# Patient Record
Sex: FEMALE | Race: AMERICAN INDIAN OR ALASKA NATIVE | NOT HISPANIC OR LATINO | URBAN - METROPOLITAN AREA
[De-identification: names, ages, dates, MRNs, and addresses within clinical notes are randomized per-mention and may not be internally consistent; named-entity substitution may affect disease eponyms.]

---

## 2020-02-29 ENCOUNTER — OUTPATIENT (OUTPATIENT)
Dept: OUTPATIENT SERVICES | Facility: HOSPITAL | Age: 60
LOS: 1 days | Discharge: HOME | End: 2020-02-29
Payer: COMMERCIAL

## 2020-02-29 DIAGNOSIS — Z12.31 ENCOUNTER FOR SCREENING MAMMOGRAM FOR MALIGNANT NEOPLASM OF BREAST: ICD-10-CM

## 2020-02-29 PROCEDURE — 77063 BREAST TOMOSYNTHESIS BI: CPT | Mod: 26

## 2020-02-29 PROCEDURE — 77067 SCR MAMMO BI INCL CAD: CPT | Mod: 26

## 2020-04-01 ENCOUNTER — OUTPATIENT (OUTPATIENT)
Dept: OUTPATIENT SERVICES | Facility: HOSPITAL | Age: 60
LOS: 1 days | Discharge: HOME | End: 2020-04-01
Payer: COMMERCIAL

## 2020-04-01 DIAGNOSIS — R92.8 OTHER ABNORMAL AND INCONCLUSIVE FINDINGS ON DIAGNOSTIC IMAGING OF BREAST: ICD-10-CM

## 2020-04-01 PROCEDURE — G0279: CPT | Mod: 26

## 2020-04-01 PROCEDURE — 77065 DX MAMMO INCL CAD UNI: CPT | Mod: 26,LT

## 2020-12-22 ENCOUNTER — APPOINTMENT (OUTPATIENT)
Dept: OPHTHALMOLOGY | Facility: CLINIC | Age: 60
End: 2020-12-22

## 2021-10-21 PROBLEM — Z00.00 ENCOUNTER FOR PREVENTIVE HEALTH EXAMINATION: Status: ACTIVE | Noted: 2021-10-21

## 2021-11-01 ENCOUNTER — OUTPATIENT (OUTPATIENT)
Dept: OUTPATIENT SERVICES | Facility: HOSPITAL | Age: 61
LOS: 1 days | Discharge: HOME | End: 2021-11-01
Payer: COMMERCIAL

## 2021-11-01 ENCOUNTER — RESULT REVIEW (OUTPATIENT)
Age: 61
End: 2021-11-01

## 2021-11-01 DIAGNOSIS — M25.512 PAIN IN LEFT SHOULDER: ICD-10-CM

## 2021-11-01 PROCEDURE — 73221 MRI JOINT UPR EXTREM W/O DYE: CPT | Mod: 26,LT

## 2022-08-26 ENCOUNTER — APPOINTMENT (OUTPATIENT)
Dept: ORTHOPEDIC SURGERY | Facility: CLINIC | Age: 62
End: 2022-08-26

## 2022-08-26 VITALS — HEIGHT: 64 IN | WEIGHT: 130 LBS | BODY MASS INDEX: 22.2 KG/M2

## 2022-08-26 DIAGNOSIS — E11.9 TYPE 2 DIABETES MELLITUS W/OUT COMPLICATIONS: ICD-10-CM

## 2022-08-26 DIAGNOSIS — M25.511 PAIN IN RIGHT SHOULDER: ICD-10-CM

## 2022-08-26 PROCEDURE — 73030 X-RAY EXAM OF SHOULDER: CPT | Mod: RT

## 2022-08-26 PROCEDURE — 99203 OFFICE O/P NEW LOW 30 MIN: CPT

## 2022-08-26 RX ORDER — LEVOTHYROXINE SODIUM 0.17 MG/1
TABLET ORAL
Refills: 0 | Status: ACTIVE | COMMUNITY

## 2022-08-26 RX ORDER — METFORMIN HYDROCHLORIDE 625 MG/1
TABLET ORAL
Refills: 0 | Status: ACTIVE | COMMUNITY

## 2022-08-26 NOTE — PHYSICAL EXAM
[Right] : right shoulder [] : motor and sensory intact distally [TWNoteComboBox7] : active forward flexion 70 degrees [TWNoteComboBox4] : passive forward flexion 170 degrees [de-identified] : active abduction 70 degrees [TWNoteComboBox6] : internal rotation L4

## 2022-08-26 NOTE — DATA REVIEWED
[Right] : of the right [Shoulder] : shoulder [FreeTextEntry1] :  Three views of the right shoulder were obtained:  X-rays show no fracture, no soft tissue calcifications, no bone masses.

## 2022-08-26 NOTE — DISCUSSION/SUMMARY
[de-identified] : I reviewed the x-ray findings with the patient and her son.  She will start anti-inflammatory medication, Rx for naproxen sent to the pharmacy.  She can use Tylenol for breakthrough pain.  She will start formal physical therapy, Rx provided for that.  I will see her back in 6 weeks for further evaluation, if she is still symptomatic at that point time I would recommend further imaging studies.\par \par Supervising physician:  Dr. Arroyo

## 2022-09-23 ENCOUNTER — RX RENEWAL (OUTPATIENT)
Age: 62
End: 2022-09-23

## 2022-09-28 ENCOUNTER — APPOINTMENT (OUTPATIENT)
Dept: ORTHOPEDIC SURGERY | Facility: CLINIC | Age: 62
End: 2022-09-28

## 2022-10-24 ENCOUNTER — RX RENEWAL (OUTPATIENT)
Age: 62
End: 2022-10-24

## 2022-10-24 RX ORDER — NAPROXEN 500 MG/1
500 TABLET ORAL
Qty: 60 | Refills: 0 | Status: ACTIVE | COMMUNITY
Start: 2022-08-26 | End: 1900-01-01

## 2025-05-28 ENCOUNTER — APPOINTMENT (OUTPATIENT)
Dept: CARDIOLOGY | Facility: CLINIC | Age: 65
End: 2025-05-28

## 2025-06-12 ENCOUNTER — EMERGENCY (EMERGENCY)
Facility: HOSPITAL | Age: 65
LOS: 0 days | Discharge: HOME | End: 2025-06-12
Attending: EMERGENCY MEDICINE
Payer: MEDICAID

## 2025-06-12 VITALS
TEMPERATURE: 99 F | OXYGEN SATURATION: 97 % | HEIGHT: 59 IN | DIASTOLIC BLOOD PRESSURE: 70 MMHG | SYSTOLIC BLOOD PRESSURE: 129 MMHG | WEIGHT: 136.91 LBS | HEART RATE: 120 BPM | RESPIRATION RATE: 18 BRPM

## 2025-06-12 VITALS
TEMPERATURE: 99 F | SYSTOLIC BLOOD PRESSURE: 127 MMHG | RESPIRATION RATE: 19 BRPM | HEART RATE: 116 BPM | OXYGEN SATURATION: 96 % | DIASTOLIC BLOOD PRESSURE: 70 MMHG

## 2025-06-12 DIAGNOSIS — R50.9 FEVER, UNSPECIFIED: ICD-10-CM

## 2025-06-12 DIAGNOSIS — R11.2 NAUSEA WITH VOMITING, UNSPECIFIED: ICD-10-CM

## 2025-06-12 DIAGNOSIS — E78.5 HYPERLIPIDEMIA, UNSPECIFIED: ICD-10-CM

## 2025-06-12 DIAGNOSIS — R05.1 ACUTE COUGH: ICD-10-CM

## 2025-06-12 DIAGNOSIS — R05.9 COUGH, UNSPECIFIED: ICD-10-CM

## 2025-06-12 DIAGNOSIS — E03.9 HYPOTHYROIDISM, UNSPECIFIED: ICD-10-CM

## 2025-06-12 DIAGNOSIS — E11.9 TYPE 2 DIABETES MELLITUS WITHOUT COMPLICATIONS: ICD-10-CM

## 2025-06-12 PROCEDURE — 71046 X-RAY EXAM CHEST 2 VIEWS: CPT | Mod: 26

## 2025-06-12 PROCEDURE — 99283 EMERGENCY DEPT VISIT LOW MDM: CPT | Mod: 25

## 2025-06-12 PROCEDURE — 99284 EMERGENCY DEPT VISIT MOD MDM: CPT

## 2025-06-12 PROCEDURE — 71046 X-RAY EXAM CHEST 2 VIEWS: CPT

## 2025-06-12 RX ORDER — ACETAMINOPHEN 500 MG/5ML
975 LIQUID (ML) ORAL ONCE
Refills: 0 | Status: COMPLETED | OUTPATIENT
Start: 2025-06-12 | End: 2025-06-12

## 2025-06-12 RX ADMIN — Medication 975 MILLIGRAM(S): at 20:22

## 2025-06-12 NOTE — ED PROVIDER NOTE - PATIENT PORTAL LINK FT
You can access the FollowMyHealth Patient Portal offered by [facility] by registering at the following website: [email or web address]. By joining [facility]’s FollowMyHealth portal, you will also be able to view your health information using other applications (apps) compatible with our system.

## 2025-06-12 NOTE — ED ADULT NURSE NOTE - NSFALLUNIVINTERV_ED_ALL_ED
Bed/Stretcher in lowest position, wheels locked, appropriate side rails in place/Call bell, personal items and telephone in reach/Instruct patient to call for assistance before getting out of bed/chair/stretcher/Non-slip footwear applied when patient is off stretcher/[place] to call system/Physically safe environment - no spills, clutter or unnecessary equipment/Purposeful proactive rounding/Room/bathroom lighting operational, light cord in reach

## 2025-06-12 NOTE — ED PROVIDER NOTE - OBJECTIVE STATEMENT
65-year-old female with a past medical history of hyperlipidemia, diabetes, hypothyroidism presents complaining of cough.  Patient experienced cough/body aches/nausea/vomiting/fever 1 week prior.  Patient was seen at urgent care at that time and started on azithromycin and cough medication.  Patient was seen by her PCP 2 days prior who performed a chest x-ray that showed for right middle lobe pneumonia.  PCP sent Augmentin and doxycycline to the pharmacy but wanted the patient to be evaluated in ED.  Patient states to only be experiencing cough but her other symptoms have subsided. pt denies any other symptoms including fevers, chill, headache, recent illness/travel, abdominal pain, chest pain, or SOB.

## 2025-06-12 NOTE — ED PROVIDER NOTE - CLINICAL SUMMARY MEDICAL DECISION MAKING FREE TEXT BOX
64 yo woman w/ no sig pmhx here w/ cough  pt w/ 1 week of ffever and cough and treated w/ z-pack  went for f/u today and found to have infiltrate on CXR and sent to ED for further evaluatino  no cp, sob, curiel  no weight loss, no night sweats  hx of BCG vax as child  no TB hx    wd/wn  nad  rrr s1s2 wnlc  cta b/l  nt/nd + BS  aao x 3  no incr WOB or incr RR  gait stable    64 yo woman w/ pneumonia. Failure of z-pack not a treatment failure as z-pack inappropriate coverage for CAP  will obtain CXR and likely treat w/ augmentin

## 2025-06-12 NOTE — ED PROVIDER NOTE - WR INTERPRETATION 1
no ptx, cardiomediastinal silhouette unremarkable, no focal infiltrate as interpreted by dr. narayanan